# Patient Record
Sex: MALE | Race: WHITE | NOT HISPANIC OR LATINO | URBAN - METROPOLITAN AREA
[De-identification: names, ages, dates, MRNs, and addresses within clinical notes are randomized per-mention and may not be internally consistent; named-entity substitution may affect disease eponyms.]

---

## 2019-02-19 ENCOUNTER — EMERGENCY (EMERGENCY)
Facility: HOSPITAL | Age: 24
LOS: 1 days | Discharge: HOME | End: 2019-02-19
Attending: EMERGENCY MEDICINE
Payer: COMMERCIAL

## 2019-02-19 VITALS
SYSTOLIC BLOOD PRESSURE: 142 MMHG | HEART RATE: 111 BPM | RESPIRATION RATE: 20 BRPM | WEIGHT: 184.97 LBS | HEIGHT: 72 IN | DIASTOLIC BLOOD PRESSURE: 87 MMHG | TEMPERATURE: 99 F | OXYGEN SATURATION: 99 %

## 2019-02-19 VITALS
DIASTOLIC BLOOD PRESSURE: 81 MMHG | HEART RATE: 78 BPM | RESPIRATION RATE: 20 BRPM | SYSTOLIC BLOOD PRESSURE: 134 MMHG | OXYGEN SATURATION: 98 %

## 2019-02-19 PROCEDURE — 99284 EMERGENCY DEPT VISIT MOD MDM: CPT

## 2019-02-19 PROCEDURE — 96375 TX/PRO/DX INJ NEW DRUG ADDON: CPT

## 2019-02-19 PROCEDURE — 96374 THER/PROPH/DIAG INJ IV PUSH: CPT

## 2019-02-19 PROCEDURE — 96361 HYDRATE IV INFUSION ADD-ON: CPT

## 2019-02-19 PROCEDURE — 99284 EMERGENCY DEPT VISIT MOD MDM: CPT | Mod: 25

## 2019-02-19 RX ORDER — ACETAMINOPHEN 500 MG
650 TABLET ORAL ONCE
Qty: 0 | Refills: 0 | Status: COMPLETED | OUTPATIENT
Start: 2019-02-19 | End: 2019-02-19

## 2019-02-19 RX ORDER — METOCLOPRAMIDE HCL 10 MG
10 TABLET ORAL ONCE
Qty: 0 | Refills: 0 | Status: COMPLETED | OUTPATIENT
Start: 2019-02-19 | End: 2019-02-19

## 2019-02-19 RX ORDER — SODIUM CHLORIDE 9 MG/ML
1000 INJECTION INTRAMUSCULAR; INTRAVENOUS; SUBCUTANEOUS ONCE
Qty: 0 | Refills: 0 | Status: COMPLETED | OUTPATIENT
Start: 2019-02-19 | End: 2019-02-19

## 2019-02-19 RX ORDER — KETOROLAC TROMETHAMINE 30 MG/ML
15 SYRINGE (ML) INJECTION ONCE
Qty: 0 | Refills: 0 | Status: DISCONTINUED | OUTPATIENT
Start: 2019-02-19 | End: 2019-02-19

## 2019-02-19 RX ADMIN — Medication 650 MILLIGRAM(S): at 16:06

## 2019-02-19 RX ADMIN — Medication 15 MILLIGRAM(S): at 16:30

## 2019-02-19 RX ADMIN — SODIUM CHLORIDE 1000 MILLILITER(S): 9 INJECTION INTRAMUSCULAR; INTRAVENOUS; SUBCUTANEOUS at 16:13

## 2019-02-19 RX ADMIN — SODIUM CHLORIDE 1000 MILLILITER(S): 9 INJECTION INTRAMUSCULAR; INTRAVENOUS; SUBCUTANEOUS at 17:00

## 2019-02-19 RX ADMIN — Medication 10 MILLIGRAM(S): at 16:13

## 2019-02-19 RX ADMIN — Medication 15 MILLIGRAM(S): at 16:13

## 2019-02-19 RX ADMIN — Medication 650 MILLIGRAM(S): at 16:30

## 2019-02-19 NOTE — ED STATDOCS - PROGRESS NOTE DETAILS
LUISA Guevara NOTE: Pt evaluated at bedside. Reports migraine feels like his usual headache. Pt evaluated prior by intake physician. Otherwise HPI/PE/ROS as noted above. Will follow up plan per intake physician LUISA Guevara NOTE: Pt evaluated at bedside. Reports migraine feels like his usual headache. Under some stress with school lately.  Neuro intact. Pt evaluated prior by intake physician. Otherwise HPI/PE/ROS as noted above. Will follow up plan per intake physician LUISA Guevara NOTE: Pt reports improvement in symptoms s/p pain/fluids, comfortable, wants to go home. D/w Dr. Barba, will d/c home with PMD f/u. LUISA Guevara NOTE: Patient stable for discharge, reports improvement in pain, vital signs stable, tolerating PO, ambulatory in ED.  Discussion with pt includes but is not limited to: results, plan, proper medication use and side effects, and return precautions. Patient will follow up with their PMD in 1-2 days and any specialists discussed. Advised patient to seek immediate medical attention for any new/worsening symptoms or concerns.  Patient provided with ample opportunity to ask questions which were answered to the fullest extent. Patient verbalized understanding and agreement of plan.

## 2019-02-19 NOTE — ED ADULT NURSE NOTE - NSIMPLEMENTINTERV_GEN_ALL_ED
Implemented All Universal Safety Interventions:  [place] to call system. Call bell, personal items and telephone within reach. Instruct patient to call for assistance. Room bathroom lighting operational. Non-slip footwear when patient is off stretcher. Physically safe environment: no spills, clutter or unnecessary equipment. Stretcher in lowest position, wheels locked, appropriate side rails in place.

## 2019-02-19 NOTE — ED STATDOCS - PHYSICAL EXAMINATION
Gen: Mild distress, non toxic  HEENT: Mucous membranes moist, pink conjunctivae, EOMI, mild photophobia  CV: RRR  Resp: CTAB, normal rate and effort  GI: Abdomen soft, NT, ND. No rebound, no guarding  Neuro: A&O x 3, moving all 4 extremities, normal sensation, cranial nerves 2-12 intact

## 2019-02-19 NOTE — ED ADULT NURSE NOTE - CHPI ED NUR SYMPTOMS NEG
no decreased eating/drinking/no tingling/no weakness/no dizziness/no fever/no vomiting/no pain/no chills

## 2019-02-19 NOTE — ED STATDOCS - NS ED ROS FT
ROS: + HA, nausea, photophobia - No fever/chills.  No chest painNo SOB/cough/. No abdominal pain,No dysuria/frequency. No Dizziness.    No rashes  No numbness/weakness

## 2019-02-19 NOTE — ED STATDOCS - OBJECTIVE STATEMENT
24 y/o M pt with hx of migraines, presents to ED c/o frontal HA with assoc. nausea, and photophobia. Pt reports that his migraines usually flare up when he is stressed; pt admits to stress with school and work. Pt states he takes Excedrin as needed with his migraine flare-ups; last dose was at 6AM-7AM today. Denies vomiting, blurry vision, f/c, CP, abdominal pain. No further complaints at this time.

## 2019-02-19 NOTE — ED ADULT NURSE NOTE - NS ED NURSE DC INFO COMPLEXITY
Simple: Patient demonstrates quick and easy understanding/D/C instructions not given/Verbalized Understanding

## 2019-02-19 NOTE — ED STATDOCS - CLINICAL SUMMARY MEDICAL DECISION MAKING FREE TEXT BOX
Pt reports similar migraines in past, likely migraine, no red flags, give symptomatic tx and re-assess.

## 2019-02-19 NOTE — ED STATDOCS - ATTENDING CONTRIBUTION TO CARE
Madalyn: I performed a face to face bedside interview with patient regarding history of present illness, review of symptoms and past medical history. I completed an independent physical exam and ordered tests/medications as needed.  I have discussed patient's plan of care with advanced care provider. The advanced care provider assisted in  executing the discussed plan. I was available for any questions or issues that may have arose during the execution of the plan of care.
